# Patient Record
Sex: FEMALE | Race: WHITE | Employment: STUDENT | URBAN - METROPOLITAN AREA
[De-identification: names, ages, dates, MRNs, and addresses within clinical notes are randomized per-mention and may not be internally consistent; named-entity substitution may affect disease eponyms.]

---

## 2019-07-17 ENCOUNTER — OFFICE VISIT (OUTPATIENT)
Dept: INTERNAL MEDICINE CLINIC | Age: 19
End: 2019-07-17
Payer: COMMERCIAL

## 2019-07-17 VITALS
DIASTOLIC BLOOD PRESSURE: 60 MMHG | BODY MASS INDEX: 23.86 KG/M2 | WEIGHT: 152 LBS | HEIGHT: 67 IN | SYSTOLIC BLOOD PRESSURE: 110 MMHG

## 2019-07-17 DIAGNOSIS — Z13.1 SCREENING FOR DIABETES MELLITUS: ICD-10-CM

## 2019-07-17 DIAGNOSIS — J30.2 SEASONAL ALLERGIES: ICD-10-CM

## 2019-07-17 DIAGNOSIS — Z13.220 LIPID SCREENING: ICD-10-CM

## 2019-07-17 DIAGNOSIS — E01.0 THYROMEGALY: ICD-10-CM

## 2019-07-17 DIAGNOSIS — Z83.49 FAMILY HISTORY OF HASHIMOTO THYROIDITIS: ICD-10-CM

## 2019-07-17 DIAGNOSIS — Z00.00 ANNUAL PHYSICAL EXAM: Primary | ICD-10-CM

## 2019-07-17 DIAGNOSIS — Z83.49 FAMILY HISTORY OF THYROID DISEASE: ICD-10-CM

## 2019-07-17 PROCEDURE — 99385 PREV VISIT NEW AGE 18-39: CPT | Performed by: INTERNAL MEDICINE

## 2019-07-17 SDOH — HEALTH STABILITY: MENTAL HEALTH: HOW OFTEN DO YOU HAVE A DRINK CONTAINING ALCOHOL?: 2-3 TIMES A WEEK

## 2019-07-17 SDOH — HEALTH STABILITY: MENTAL HEALTH: HOW MANY STANDARD DRINKS CONTAINING ALCOHOL DO YOU HAVE ON A TYPICAL DAY?: 3 OR 4

## 2019-07-17 ASSESSMENT — PATIENT HEALTH QUESTIONNAIRE - PHQ9
1. LITTLE INTEREST OR PLEASURE IN DOING THINGS: 0
SUM OF ALL RESPONSES TO PHQ QUESTIONS 1-9: 0
2. FEELING DOWN, DEPRESSED OR HOPELESS: 0
SUM OF ALL RESPONSES TO PHQ9 QUESTIONS 1 & 2: 0
SUM OF ALL RESPONSES TO PHQ QUESTIONS 1-9: 0

## 2019-07-17 NOTE — PROGRESS NOTES
[facility]  Internal Medicine Progress Note  [place] MD  7/17/2019    Luz Elena Grant  YOB: 2000    Medical Assistant Triage:     Chief Complaint   Patient presents with    Annual Exam       HPI: 22 y.o. female here today to establish care and assess status of chronic medical problems. 4th of 5 years at [place], [place] and [employer]. Research in [place] () this summer. Will be off campus this year. 3 roommates. Semester abroad in [place] 1st semester last year. Gained weight but has been able to lose gradually since back. Allergies spring and fall. Zyrtec. Laser retinal tear last month. Very Tinnie Starling Dr. Link Vu March of this year. Past Medical History:   Diagnosis Date    Seasonal allergies        Past Surgical History:   Procedure Laterality Date    REPAIR RETINAL TEAR/HOLE  06/2019    TONSILLECTOMY AND ADENOIDECTOMY  06/2017    WISDOM TOOTH EXTRACTION  07/2017       Family Hx:      Problem Relation Age of Onset    Hashimoto Thyroiditis Mother     Diabetes Mother     Hypertension Father     No Known Problems Brother     Breast Cancer Paternal Aunt         mid 42's       Social History     Tobacco Use    Smoking status: Never Smoker    Smokeless tobacco: Never Used   Substance Use Topics    Alcohol use: Yes     Frequency: 2-3 times a week     Drinks per session: 3 or 4     Binge frequency: Less than monthly       Review of Systems  As documented in HPI and patient questionnaire (scanned)    Current Outpatient Medications   Medication Sig Dispense Refill    Levonorgestrel (KYLEENA IU) by Intrauterine route       No current facility-administered medications for this visit. Physical Exam  Vitals:    07/17/19 0803   BP: 110/60   Weight: 152 lb (68.9 kg)   Height: 5' 6.5\" (1.689 m)     Body mass index is 24.17 kg/m². Physical Exam   Constitutional: She appears well-developed and well-nourished.    HENT:   Head: Normocephalic

## 2019-08-16 ENCOUNTER — PATIENT MESSAGE (OUTPATIENT)
Dept: INTERNAL MEDICINE CLINIC | Age: 19
End: 2019-08-16

## 2019-10-22 ENCOUNTER — PATIENT MESSAGE (OUTPATIENT)
Dept: INTERNAL MEDICINE CLINIC | Age: 19
End: 2019-10-22

## 2019-12-23 ENCOUNTER — HOSPITAL ENCOUNTER (OUTPATIENT)
Dept: ULTRASOUND IMAGING | Age: 19
Discharge: HOME | End: 2019-12-23
Payer: COMMERCIAL

## 2019-12-23 DIAGNOSIS — E01.0 THYROMEGALY: ICD-10-CM

## 2019-12-23 PROCEDURE — 76536 US EXAM OF HEAD AND NECK: CPT

## 2019-12-26 ENCOUNTER — TELEPHONE (OUTPATIENT)
Dept: INTERNAL MEDICINE CLINIC | Age: 19
End: 2019-12-26

## 2020-07-14 ENCOUNTER — HOSPITAL ENCOUNTER (OUTPATIENT)
Age: 20
Discharge: HOME | End: 2020-07-14
Payer: COMMERCIAL

## 2020-07-14 ENCOUNTER — HOSPITAL ENCOUNTER (OUTPATIENT)
Dept: GENERAL RADIOLOGY | Age: 20
Discharge: HOME | End: 2020-07-14
Payer: COMMERCIAL

## 2020-07-14 PROCEDURE — 73630 X-RAY EXAM OF FOOT: CPT

## 2020-09-22 ENCOUNTER — TELEPHONE (OUTPATIENT)
Dept: INTERNAL MEDICINE CLINIC | Age: 20
End: 2020-09-22

## 2020-12-07 ENCOUNTER — VIRTUAL VISIT (OUTPATIENT)
Dept: PSYCHOLOGY | Age: 20
End: 2020-12-07
Payer: COMMERCIAL

## 2020-12-07 PROCEDURE — 90791 PSYCH DIAGNOSTIC EVALUATION: CPT | Performed by: PSYCHOLOGIST

## 2020-12-07 ASSESSMENT — ANXIETY QUESTIONNAIRES
2. NOT BEING ABLE TO STOP OR CONTROL WORRYING: 2-OVER HALF THE DAYS
1. FEELING NERVOUS, ANXIOUS, OR ON EDGE: 3-NEARLY EVERY DAY
4. TROUBLE RELAXING: 2-OVER HALF THE DAYS
6. BECOMING EASILY ANNOYED OR IRRITABLE: 2-OVER HALF THE DAYS
3. WORRYING TOO MUCH ABOUT DIFFERENT THINGS: 2-OVER HALF THE DAYS
5. BEING SO RESTLESS THAT IT IS HARD TO SIT STILL: 1-SEVERAL DAYS
GAD7 TOTAL SCORE: 13
7. FEELING AFRAID AS IF SOMETHING AWFUL MIGHT HAPPEN: 1-SEVERAL DAYS

## 2020-12-07 ASSESSMENT — PATIENT HEALTH QUESTIONNAIRE - PHQ9
6. FEELING BAD ABOUT YOURSELF - OR THAT YOU ARE A FAILURE OR HAVE LET YOURSELF OR YOUR FAMILY DOWN: 1
3. TROUBLE FALLING OR STAYING ASLEEP: 2
7. TROUBLE CONCENTRATING ON THINGS, SUCH AS READING THE NEWSPAPER OR WATCHING TELEVISION: 2
8. MOVING OR SPEAKING SO SLOWLY THAT OTHER PEOPLE COULD HAVE NOTICED. OR THE OPPOSITE, BEING SO FIGETY OR RESTLESS THAT YOU HAVE BEEN MOVING AROUND A LOT MORE THAN USUAL: 1
9. THOUGHTS THAT YOU WOULD BE BETTER OFF DEAD, OR OF HURTING YOURSELF: 0
4. FEELING TIRED OR HAVING LITTLE ENERGY: 2
SUM OF ALL RESPONSES TO PHQ QUESTIONS 1-9: 13
SUM OF ALL RESPONSES TO PHQ QUESTIONS 1-9: 13
1. LITTLE INTEREST OR PLEASURE IN DOING THINGS: 2
SUM OF ALL RESPONSES TO PHQ9 QUESTIONS 1 & 2: 3
5. POOR APPETITE OR OVEREATING: 2
SUM OF ALL RESPONSES TO PHQ QUESTIONS 1-9: 13
2. FEELING DOWN, DEPRESSED OR HOPELESS: 1

## 2020-12-07 NOTE — PATIENT INSTRUCTIONS
Deep breathing    Why does this work? When we are stressed or anxious, our stress response in the body is turned on. We can use deep breathing to turn this off. There is a nerve in the body called the, vagus nerve, that helps relax your body. When we take deep, belly breaths, we place pressure on this nerve to help the body relax. Also, you are allowing oxygen to get to your body and brain and breathing out the carbon dioxide which is a toxin to the body. Deep breathing instructions:    1 Sit comfortably, with your knees bent and your shoulders, head and neck relaxed. You can close your eyes if comfortable or find a comfortable place to focus your eyes (e.g. spot on the wall/floor where eyes are not strained)  2 Place one hand on your upper chest and the other just below your rib cage. This will allow you to feel your diaphragm move as you breathe. 3 Breathe in slowly through your nose so that your stomach moves out against your hand. The hand on your chest should remain as still as possible. 4 Tighten your stomach muscles, letting them fall inward as you exhale through pursed lips. The hand on your upper chest must remain as still as possible. Note: You may notice an increased effort will be needed to use the diaphragm correctly. At first, you'll probably get tired while doing this exercise or find your thoughts wondering. But keep at it, because with continued practice, diaphragmatic breathing will become easy and automatic. How often should I practice this exercise? At first, practice this exercise 3-5 minutes about 3-4 times per day. Gradually increase the amount of time you spend doing this exercise, and perhaps even increase the effort of the exercise by placing a book on your abdomen. It is important to practice this skill regularly, even when you don't \"need\" it. Jose Elias: [id number]    Parcel video:  [email or web address]    TIPS  1. Try to focus on one thought or phrase that is neutral or positive. If your mind wonders, that is okay, bring your attention back to that thought/phrase. 2. Practice when you do not need this skill. It will not work if the first time practicing is during an emotional emergency. Your body and brain need to learn how this skill works  3. You don't have to be perfect at it, just do your best      Mindfulness with the senses    You can follow this exercise that has you spend one minute on each sense or you can pick to describe in detail  3 things you hear, smell, see, taste, touch. Sit in a comfortable upright position with your feet planted flat on the ground. Rest your hands on your thighs or on your desk. Notice your breath. No need to breathe in any particular way. Just bring attention to each part of the breath- the inhale, exhale, and space in between. Bring awareness to each of your 5 senses. The point here is to focus on the present moment and how each sense is being activated in that moment. Hear: For one minute, begin to notice all of the sounds around you. Try not to  the sounds- just notice them. They are not good or bad, they just are. Sounds might be internal, like breathing or digestion. Sounds might be close by or more distant like the sound of traffic. Are you now hearing more than you were before you started? You may begin to notice subtle sounds you did not hear before. Can you hear them now? Smell: For one minute, shift your attention to notice the smells of your environment. Maybe you smell food. You might become aware of the smell of trees or plants if you are outside. You might notice the smell of books or paper. Sometimes closing your eyes can help sharpen your attention. See: For one minute, observe your surrounding and notice the colors, shapes and textures. If you really look, you may notice things that have gone unnoticed. Taste: For one minute, you can do this one even if you have food in your mouth.  You may notice an aftertaste of a previous drink or meal. You can just notice your tongue in your mouth, your saliva, and your breath as you exhale. We have tastes in our mouth that often go unnoticed. You can run your tongue over your teeth and cheeks to help you become more aware. Touch: For one minute, bring your attention to the sensations of skin contact with your chair, clothing, and feet on the floor. You can notice the pressure between your feet and the floor or your body and the chair. You can observe temperature like the warmth or coolness of your hands or feet. You might take time to feel the textures that you noticed by sight a moment ago. You can feel several objects on your desk to fully focus your attention on the present. When finished, pause to notice how your body feels in this moment. Compare how you feel now with how you felt 5 minutes ago. What has changed? Try this exercise next time you're feeling overwhelmed. This can be useful to use before a test or speech, too!

## 2020-12-07 NOTE — PROGRESS NOTES
Behavioral Health Consultation  Audra Garza Psy.D. Psychologist  12/7/2020  Start time 8:00am Stop time 8:30am    Time spent with Patient: 30 minutes  This is patient's [facility] appointment. Reason for Consult:  anxiety  Referring Provider: PCP    Feedback for PCP: No action needed. Writer will continue to follow pt. At initial visit, pt/guardian provided informed consent for the behavioral health program. Discussed with patient model of service to include the limits of confidentiality (i.e. abuse reporting, suicide intervention, etc.) and short-term intervention focused approach. Pt/guardian indicated understanding    TELEHEALTH VISIT -- Audio and video (During [facility]- public health emergency)  }  Pursuant to the emergency declaration under the [facility], 1135 waiver authority and the [employer] and [employer] Act, this visit was conducted, with patient/guardian's consent, to reduce the patient's risk of exposure to COVID-19 and provide continuity of care for an established patient. Services were provided through a telehealth discussion to substitute for in-person clinic visit. Pt/guardian gave verbal informed consent to participate in telehealth services. Consent:  She and/or health care decision maker is aware that that she may receive a bill for this service, depending on her insurance coverage, and has provided verbal consent to proceed: Yes    Conducted a risk-benefit analysis and determined that the patient's presenting problems are consistent with the use of telepsychology. Determined that the patient has sufficient knowledge and skills in the use of technology enabling them to adequately benefit from telepsychology. It was determined that this patient was able to be properly treated without an in-person session.  Patient/guardian verified that they were currently located at the [facility] address that was provided during registration. Verified the following information:  Patient's identification: Yes  Patient location: [facility] [address]  Patient's call back number: [phone]   Patient's emergency contact's name and number, as well as permission to contact them if needed: Extended Emergency Contact Information  Primary Emergency Contact: [address] Phone: [phone]  Mobile Phone: [phone]  Relation: Parent  Preferred language: English   needed? No     Provider location: [address] [place] [address], [address] [place] [address] affirm this is an episode with a patient who has not had a related appointment within my department in the past 7 days or scheduled within the next 24 hours. S:    Reports that she has been feeling more down and anxious over the past few months. Is going to be moving to [place] for work in June or July and is nervous about this. Does not want to move away from family and friends, but is excited about the job. Worries about her performance at this job. States that she and her boyfriend are still deciding what to do about him moving out there with her.      Depression sx: anhedonia/diminished interest in activities, depressed mood, insomnia, fatigue and difficulties with concentration, feels down this time of year, but manages it pretty well, but feels that she is not managing as well this year   Anxiety sx: excessive worry, uncontrollable worry, fatigue, difficulty concentrating and sleep disturbance, worries about moving to [place], not going to do well at new job, school   SI/HI: Denied   Coping skills: exercise, light therapy    History:    Psychiatric history:   Current psychotropic medications:  Denied   Past mental health treatment: therapy in high school for anxiety and depression    Social History:    Social: family, friends all in [address] [place] [address], boyfriend    Family psychiatric history: Denied   Employment: moving to [address] [place] [address] to be an  in June or July,

## 2020-12-18 NOTE — PROGRESS NOTES
Behavioral Health Consultation  Carolina Morales Psy.D. Psychologist  12/21/2020    Start time 8:30am  Stop time 8:47am    Time spent with Patient: 17 minutes  [facility]. Reason for Consult:  anxiety  Referring Provider: PCP    Feedback for PCP: No action needed. Writer will continue to follow pt. At initial visit, pt/guardian provided informed consent for the behavioral health program. Discussed with patient model of service to include the limits of confidentiality (i.e. abuse reporting, suicide intervention, etc.) and short-term intervention focused approach. Pt/guardian indicated understanding    TELEHEALTH VISIT -- Audio and video (During [facility] public health emergency)  }  Pursuant to the emergency declaration under the [facility], 1135 waiver authority and the [employer] and [employer] Act, this visit was conducted, with patient/guardian's consent, to reduce the patient's risk of exposure to COVID-19 and provide continuity of care for an established patient. Services were provided through a telehealth discussion to substitute for in-person clinic visit. Pt/guardian gave verbal informed consent to participate in telehealth services. Consent:  She and/or health care decision maker is aware that that she may receive a bill for this service, depending on her insurance coverage, and has provided verbal consent to proceed: Yes    Conducted a risk-benefit analysis and determined that the patient's presenting problems are consistent with the use of telepsychology. Determined that the patient has sufficient knowledge and skills in the use of technology enabling them to adequately benefit from telepsychology. It was determined that this patient was able to be properly treated without an in-person session.  Patient/guardian verified that they were currently located at the [facility] address that was provided during registration. Verified the following information:  Patient's identification: Yes  Patient location: [place] [address]  Patient's call back number: [phone]   Patient's emergency contact's name and number, as well as permission to contact them if needed: Extended Emergency Contact Information  Primary Emergency Contact: Fco Felix Phone: [phone]  Mobile Phone: [phone]  Relation: Parent  Preferred language: English   needed? No     Provider location: [place], [address] affirm this is an episode with a patient who has not had a related appointment within my department in the past 7 days or scheduled within the next 24 hours. S:    Pt set the following goals at last visit: 1) deep breathing 2) mindfulness with senses    Patient reports that her mood has improved significantly since initial visit. Has been practicing living in the present moment and not worrying too much about moving to [place] in a few months. Patient states that she has decided that her and her boyfriend are going to discuss her moving in the next three months. Patient feels good about this. Reports that she is off work for over a week and is not is school for the next month. Is excited to relax and have a break. Would like to follow-up in 6 weeks.      Depression sx: anhedonia/diminished interest in activities, depressed mood, insomnia, fatigue and difficulties with concentration, feels down this time of year, but manages it pretty well, but feels that she is not managing as well this year   Anxiety sx: excessive worry, uncontrollable worry, fatigue, difficulty concentrating and sleep disturbance, worries about moving to [place], not going to do well at new job, school   SI/HI: Denied   Coping skills: exercise, light therapy    History:    Psychiatric history:   Current psychotropic medications:  Denied   Past mental health treatment: therapy in high school for anxiety and depression    Social History:    Social: family, friends all in [place], boyfriend    Family psychiatric history: Denied   Employment: moving to [address] [place] [address] Rd to be an  in June or July, [employer]   Race/ethnicity: \"white\"   Jewish/spiritual beliefs: [facility] habits:   Sleep: varies, worries, 6-8 hours of sleep per night   Caffeine: reduced for anxiety, has helped a little   Exercise: yoga 3-4 times a week, walking or running   Medication adherence: Yes  Social History     Tobacco Use    Smoking status: Never Smoker    Smokeless tobacco: Never Used   Substance Use Topics    Alcohol use: Yes     Frequency: 2-3 times a week     Drinks per session: 3 or 4     Binge frequency: Less than monthly       Social History     Substance and Sexual Activity   Drug Use Never       Current Outpatient Medications   Medication Sig Dispense Refill    Levonorgestrel (KYLEENA IU) by Intrauterine route       No current facility-administered medications for this visit.       O:  MSE:    Appearance: good hygiene   Attitude: cooperative and friendly  Consciousness: alert  Orientation: oriented to person, place, time, general circumstance  Memory: recent and remote memory intact  Attention/Concentration: intact during session  Psychomotor Activity:normal  Eye Contact: normal  Speech: normal rate and volume, well-articulated  Mood: good  Affect: euthymic  Perception: within normal limits  Thought Content: within normal limits  Thought Process: logical, coherent and goal-directed  Insight: good  Judgment: intact  Ability to understand instructions: Yes  Ability to respond meaningfully: Yes  Morbid Ideation: no   Suicide Assessment: no suicidal ideation, plan, or intent  Homicidal Ideation: no    A:  Administered PHQ-9 (see below).   PHQ Scores 12/7/2020 7/17/2019   PHQ2 Score 3 0   PHQ9 Score 13 0     Interpretation of Total Score Depression Severity: 1-4 = Minimal depression, 5-9 = Mild depression, 10-14 = Moderate depression, 15-19 = Moderately severe depression, 20-27 = Severe depression    Administered LEÓN-7 (see below). LEÓN 7 SCORE 12/7/2020   LEÓN-7 Total Score 13     Interpretation of LEÓN-7 score: 5-9 = mild anxiety, 10-14 = moderate anxiety, 15+ = severe anxiety. Recommend referral to behavioral health for scores 10 or greater. Assessment/Progress in treatment/Treatment plan:  Patient appears to be experiencing low mood and anxiety, exacerbated by planning to move to [place] for work. Current coping skills include exercise, social supports, and light therapy and factors maintaining symptoms include catastrophic thinking. Is engaged in behavioral health treatment and reports improvements in mood. May benefit from additional brief intervention from writer for adaptive coping skill development. Will refer to specialty mental health in the future if indicated. Diagnosis:    1. Adjustment disorder with mixed anxiety and depressed mood       Patient Active Problem List   Diagnosis    Seasonal allergies    Family history of Hashimoto thyroiditis        Plan:  Set the following goals: 1) continue current coping skills    Follow-up:   Return in about 6 weeks (around 2/1/2021).      Pt interventions:  Established rapport, [place]-setting to identify pt's primary goals for [facility] visit / overall health, Supportive techniques, Engaged in treatment planning, Emphasized self-care as important for managing overall health and Praised pt for use of skills

## 2020-12-21 ENCOUNTER — TELEMEDICINE (OUTPATIENT)
Dept: PSYCHOLOGY | Age: 20
End: 2020-12-21
Payer: COMMERCIAL

## 2020-12-21 PROCEDURE — 90832 PSYTX W PT 30 MINUTES: CPT | Performed by: PSYCHOLOGIST

## 2020-12-30 ENCOUNTER — OFFICE VISIT (OUTPATIENT)
Dept: INTERNAL MEDICINE CLINIC | Age: 20
End: 2020-12-30
Payer: COMMERCIAL

## 2020-12-30 VITALS
TEMPERATURE: 97.9 F | DIASTOLIC BLOOD PRESSURE: 72 MMHG | OXYGEN SATURATION: 99 % | HEART RATE: 78 BPM | RESPIRATION RATE: 14 BRPM | WEIGHT: 135 LBS | HEIGHT: 66 IN | SYSTOLIC BLOOD PRESSURE: 116 MMHG | BODY MASS INDEX: 21.69 KG/M2

## 2020-12-30 PROCEDURE — 99395 PREV VISIT EST AGE 18-39: CPT | Performed by: INTERNAL MEDICINE

## 2020-12-30 NOTE — PROGRESS NOTES
Annual Physical Exam      Fausto Velasquez  : 2000  Date of Service: 2020    Chief Complaint:20 y.o. female here for    Chief Complaint   Patient presents with    Annual Exam       HPI:  Will be moving to [place] for job with [employer] after graduates this spring. Lost weight since COVID. Eating better    Only problem was that she had some left sided chest pain , worse when moved. Much better. No injury or precipitant  that she recalls    Exercise: yoga, cardio walking   Diet habits: halthy    No LMP recorded. (Menstrual status: Other - See Notes). .        Patient Care Team:  Jodi Velasco MD as PCP - General (Internal Medicine)  Jodi Velasco MD as PCP - [facility] Empaneled Provider  Kelli Berumen.  Donna Temple MD (Ophthalmology)  Edna Frederick (Obstetrics & Gynecology)  Ming Barlow MD as Consulting Physician (Ophthalmology)  Valentine Haney PSYD (Psychology)    Health Maintenance   Topic Date Due    Hepatitis C screen  1998    Chlamydia screen  2014    Cervical cancer screen  07/10/2023    DTaP/Tdap/Td vaccine (8 - Td) 06/15/2028    Hepatitis A vaccine  Completed    Hepatitis B vaccine  Completed    Hib vaccine  Completed    HPV vaccine  Completed    Varicella vaccine  Completed    Meningococcal (ACWY) vaccine  Completed    Flu vaccine  Completed    HIV screen  Completed    Pneumococcal 0-64 years Vaccine  Aged [employer] History   Administered Date(s) Administered    DTP 1998, 1998, 1998, 1999, 2002    HPV Quadrivalent (Gardasil) 2011, 2011, 2011    Hepatitis A Ped/Adol (Havrix, Vaqta) 2013, 05/15/2014    Hepatitis B 1998, 1998, 1998    Hib vaccine 1998, 1998, 1998, 1999    Influenza Virus Vaccine 10/18/2010, 10/26/2011, 10/10/2013, 10/22/2014, 2018, 2020    Influenza, MDCK Quadv, IM, PF (Flucelvax 4 yrs and older) 2018    MMR 06/14/1999, 03/13/2003    Meningococcal B, Recombinant Fredrich Inks) 02/15/2019, 10/20/2019    Meningococcal, MCV4, Unspecifd Conjugate (A,C,Y and W-135) 03/19/2009, 05/06/2013, 08/18/2016    Polio IPV (IPOL) 04/23/2002    Polio OPV 1998, 1998, 09/14/1999    Tdap (Boostrix, Adacel) 03/19/2009, 06/15/2018    Varicella (Varivax) 04/23/2011, 06/23/2011       No Known Allergies    Outpatient Medications Marked as Taking for the 12/30/20 encounter (Office Visit) with Susan Galindo MD   Medication Sig Dispense Refill    Levonorgestrel (KYLEENA IU) by Intrauterine route         Past Medical History:   Diagnosis Date    Family history of Hashimoto thyroiditis     Seasonal allergies      Past Surgical History:   Procedure Laterality Date    REPAIR RETINAL TEAR/HOLE  06/2019    TONSILLECTOMY AND ADENOIDECTOMY  06/2017    WISDOM TOOTH EXTRACTION  07/2017     Family History   Problem Relation Age of Onset    Hashimoto Thyroiditis Mother     Diabetes Mother     Hypertension Father     No Known Problems Brother     Breast Cancer Paternal Aunt         mid 42's     Social History     Tobacco Use    Smoking status: Never Smoker    Smokeless tobacco: Never Used   Substance Use Topics    Alcohol use: Yes     Frequency: 2-3 times a week     Drinks per session: 3 or 4     Binge frequency: Less than monthly    Drug use: Never         Review of Systems    Wt Readings from Last 3 Encounters:   12/30/20 135 lb (61.2 kg)   07/17/19 152 lb (68.9 kg)     BP Readings from Last 3 Encounters:   12/30/20 116/72   07/17/19 110/60       Physical Exam:   Vitals:    12/30/20 0804   BP: 116/72   Pulse: 78   Resp: 14   Temp: 97.9 °F (36.6 °C)   SpO2: 99%   Weight: 135 lb (61.2 kg)   Height: 5' 6\" (1.676 m)     Body mass index is 21.79 kg/m². Physical Exam  Constitutional:       Appearance: Normal appearance. She is well-developed. HENT:      Head: Normocephalic and atraumatic.       Right Ear: Tympanic membrane, ear canal and external ear normal.      Left Ear: Tympanic membrane, ear canal and external ear normal.      Nose: Nose normal.      Mouth/Throat:      Mouth: Mucous membranes are moist.      Pharynx: Oropharynx is clear. Eyes:      Conjunctiva/sclera: Conjunctivae normal.      Pupils: Pupils are equal, round, and reactive to light. Neck:      Musculoskeletal: Normal range of motion and neck supple. Thyroid: No thyromegaly. Vascular: No carotid bruit. Cardiovascular:      Rate and Rhythm: Normal rate and regular rhythm. Pulses: Normal pulses. Heart sounds: Normal heart sounds. No murmur. Pulmonary:      Effort: Pulmonary effort is normal.      Breath sounds: Normal breath sounds. Chest:      Chest wall: Tenderness (very slight chest wall pain with palpation left lower anterior rib cage) present. Abdominal:      General: Bowel sounds are normal.      Palpations: Abdomen is soft. There is no mass. Tenderness: There is no abdominal tenderness. Genitourinary:     Exam position: Supine. Musculoskeletal:      Right lower leg: No edema. Left lower leg: No edema. Lymphadenopathy:      Cervical: No cervical adenopathy. Skin:     General: Skin is warm and dry. Coloration: Skin is not pale. Comments: No suspicious skin lesions   Neurological:      General: No focal deficit present. Mental Status: She is alert. Psychiatric:         Mood and Affect: Mood normal.         Assessment/Plan:  Annual PE/Wellness exam  Discussed age appropriate preventive care including healthy diet, daily exercise, immunizations and age & gender guided screening tests. Moving to [facility] summer 2021. Will establish with new doctor once there. Vanna Walls MD    This note was generated completely or in part utilizing Dragon dictation speech recognition software. Occasionally, words are mistranscribed and despite editing, the text may contain inaccuracies due to incorrect word recognition.   If further clarification is needed please contact the office at [phone]

## 2021-05-13 LAB — PAP SMEAR, EXTERNAL: NORMAL

## 2021-11-29 ENCOUNTER — TELEPHONE (OUTPATIENT)
Dept: INTERNAL MEDICINE CLINIC | Age: 21
End: 2021-11-29

## 2021-11-29 DIAGNOSIS — M25.579 ACUTE ANKLE PAIN, UNSPECIFIED LATERALITY: Primary | ICD-10-CM

## 2021-11-29 NOTE — TELEPHONE ENCOUNTER
----- Message from Keiry Baires sent at 11/29/2021 10:07 AM EST -----  Subject: Appointment Request    Reason for Call: Routine Medicare AWV    QUESTIONS  Type of Appointment? Established Patient  Reason for appointment request? No appointments available during search  Additional Information for Provider? PT requesting annual physical, last   physical was 12/30/2020; pt has new insurance that may cover appt before   that date.   ---------------------------------------------------------------------------  --------------  CALL BACK INFO  What is the best way for the office to contact you? OK to leave message on   voicemail, OK to respond with electronic message via [employer] portal (only   for patients who have registered [employer] account)  Preferred Call Back Phone Number? [phone]  ---------------------------------------------------------------------------  --------------  SCRIPT ANSWERS  Relationship to Patient? Self  (If the patient has Medicare as their primary insurance coverage ask this   question) Are you requesting a Medicare Annual Wellness Visit? Yes   (Is the patient requesting a pap smear with their physical exam?)? No  (Is the patient requesting their annual physical and does not need PAP or   AWV per above?)? Yes   Have you been diagnosed with, awaiting test results for, or told that you   are suspected of having COVID-19 (Coronavirus)? (If patient has tested   negative or was tested as a requirement for work, school, or travel and   not based on symptoms, answer no)? No  Within the past two weeks have you developed any of the following symptoms   (answer no if symptoms have been present longer than 2 weeks or began   more than 2 weeks ago)?  Fever or Chills, Cough, Shortness of breath or   difficulty breathing, Loss of taste or smell, Sore throat, Nasal   congestion, Sneezing or runny nose, Fatigue or generalized body aches   (answer no if pain is specific to a body part e.g. back pain), Diarrhea, Headache? No  Have you had close contact with someone with COVID-19 in the last 14 days? No  (Service Expert  click yes below to proceed with [email or web address] As Usual   Scheduling)?  Yes

## 2021-11-30 NOTE — TELEPHONE ENCOUNTER
Patient actually lives in [place] and is leaving to go back home tomorrow. So she would like to keep her appointment with you today.   Routed back to Dr Katia Escalante for [facility]

## 2021-11-30 NOTE — TELEPHONE ENCOUNTER
Please call patient. She is on my schedule today for ongoing ankle pain after an injury approx 1 month ago. Fairly certain she will need to see an orthopedist, so want to save her this appointment, and direct her to ortho. I've put in a referral to Dr. Gloria Javier at [facility]. He specializes in foot and ankle. If she still wants to be seen, it's ok.     [facility], [address], J[address] [address]  Ph: [phone]

## 2021-12-29 ENCOUNTER — TELEPHONE (OUTPATIENT)
Dept: INTERNAL MEDICINE CLINIC | Age: 21
End: 2021-12-29

## 2021-12-29 ENCOUNTER — OFFICE VISIT (OUTPATIENT)
Dept: INTERNAL MEDICINE CLINIC | Age: 21
End: 2021-12-29
Payer: COMMERCIAL

## 2021-12-29 VITALS
DIASTOLIC BLOOD PRESSURE: 66 MMHG | SYSTOLIC BLOOD PRESSURE: 118 MMHG | OXYGEN SATURATION: 99 % | HEIGHT: 67 IN | WEIGHT: 144.6 LBS | RESPIRATION RATE: 16 BRPM | BODY MASS INDEX: 22.7 KG/M2 | HEART RATE: 68 BPM

## 2021-12-29 DIAGNOSIS — K62.5 RECTAL BLEEDING: ICD-10-CM

## 2021-12-29 DIAGNOSIS — Z13.220 LIPID SCREENING: ICD-10-CM

## 2021-12-29 DIAGNOSIS — Z00.00 ANNUAL PHYSICAL EXAM: Primary | ICD-10-CM

## 2021-12-29 DIAGNOSIS — Z13.1 SCREENING FOR DIABETES MELLITUS: ICD-10-CM

## 2021-12-29 PROCEDURE — 99395 PREV VISIT EST AGE 18-39: CPT | Performed by: INTERNAL MEDICINE

## 2021-12-29 SDOH — ECONOMIC STABILITY: FOOD INSECURITY: WITHIN THE PAST 12 MONTHS, THE FOOD YOU BOUGHT JUST DIDN'T LAST AND YOU DIDN'T HAVE MONEY TO GET MORE.: NEVER TRUE

## 2021-12-29 SDOH — ECONOMIC STABILITY: FOOD INSECURITY: WITHIN THE PAST 12 MONTHS, YOU WORRIED THAT YOUR FOOD WOULD RUN OUT BEFORE YOU GOT MONEY TO BUY MORE.: NEVER TRUE

## 2021-12-29 ASSESSMENT — ENCOUNTER SYMPTOMS: RECTAL PAIN: 1

## 2021-12-29 ASSESSMENT — PATIENT HEALTH QUESTIONNAIRE - PHQ9
SUM OF ALL RESPONSES TO PHQ QUESTIONS 1-9: 0
SUM OF ALL RESPONSES TO PHQ9 QUESTIONS 1 & 2: 0
2. FEELING DOWN, DEPRESSED OR HOPELESS: 0
SUM OF ALL RESPONSES TO PHQ QUESTIONS 1-9: 0
SUM OF ALL RESPONSES TO PHQ QUESTIONS 1-9: 0
1. LITTLE INTEREST OR PLEASURE IN DOING THINGS: 0

## 2021-12-29 ASSESSMENT — SOCIAL DETERMINANTS OF HEALTH (SDOH): HOW HARD IS IT FOR YOU TO PAY FOR THE VERY BASICS LIKE FOOD, HOUSING, MEDICAL CARE, AND HEATING?: NOT HARD AT ALL

## 2021-12-29 NOTE — PROGRESS NOTES
ASSESSMENT/PLAN:   Annual physical/preventive evaluation  Discussed age appropriate preventive care including healthy diet, daily exercise, immunizations and age & gender guided screening tests. Screening labs. Rectal bleeding with pain, intermittent  Most consistent with possible anal fissure but unable to visualize. No hemorrhoids seen. Provided instruction on sitz baths, avoiding constipation, provided Rx for lidocaine jelly 2%. Returning to [facility]. Advised she should see a gastroenterologist for further evaluation to evaluate for IBD.  -     CBC; Future      Return in about 1 year (around 2022). See Verma (:  2000) is a 19 y.o. female, here for annual preventive visit. [employer] [place]. In [address] now. Lives in apartment, with 1 room mate. Doing well except having bowel issues for past 4-5 months. Intermittent pain with defecation and has had some blood on toilet paper. Will have for everal days and then nothing for a week or s. No pattern. Soft stools, twice daily in last fw years. No diarrhea, no abdominal pain. No straining. No pattern. Ebenezer Crafts of family history of IBD. Exercise: yoga, HIIT workouts  Diet: healthy    No LMP recorded. (Menstrual status: Other - See Notes). Patient Care Team:  Rush Srivastava MD as PCP - General (Internal Medicine)  Rush Srivastava MD as PCP - [address] [place] [address] [facility]  Marco Dove.  Rima Blackman MD (Ophthalmology)  Guerline Yoon (Obstetrics & Gynecology)  Anil Salvador MD as Consulting Physician (Ophthalmology)  Kendra Ramos PSYD (Psychology)    Health Maintenance   Topic Date Due    Hepatitis C screen  2021 (Originally 2000)    Chlamydia screen  2021 (Originally 3/12/2014)    Pap smear  07/10/2023    DTaP/Tdap/Td vaccine (8 - Td or Tdap) 06/15/2028    Hepatitis A vaccine  Completed    Hepatitis B vaccine  Completed    Hib vaccine  Completed    HPV vaccine  Completed    Varicella vaccine  Completed    Meningococcal (ACWY) vaccine  Completed    Flu vaccine  Completed    COVID-19 Vaccine  Completed    HIV screen  Completed    Pneumococcal 0-64 years Vaccine  Aged [employer] History   Administered Date(s) Administered    COVID-19, Pfizer, PF, 30mcg/0.3mL 03/26/2021, 04/23/2021, 11/22/2021    DTP 1998, 1998, 1998, 09/14/1999, 04/23/2002    HPV Quadrivalent (Gardasil) 04/08/2011, 06/23/2011, 12/21/2011    Hepatitis A Ped/Adol (Havrix, Vaqta) 05/06/2013, 05/15/2014    Hepatitis B 1998, 1998, 1998    Hib vaccine 1998, 1998, 1998, 06/14/1999    Influenza Virus Vaccine 10/18/2010, 10/26/2011, 10/10/2013, 10/22/2014, 12/07/2018, 09/01/2020, 11/09/2021    Influenza, MDCK Quadv, IM, PF (Flucelvax 2 yrs and older) 12/07/2018    MMR 06/14/1999, 03/13/2003    Meningococcal B, Recombinant Clarene Sharif) 02/15/2019, 10/20/2019    Meningococcal, MCV4, Unspecifd Conjugate (A,C,Y and W-135) 03/19/2009, 05/06/2013, 08/18/2016    Polio IPV (IPOL) 04/23/2002    Polio OPV 1998, 1998, 09/14/1999    Tdap (Boostrix, Adacel) 03/19/2009, 06/15/2018    Varicella (Varivax) 04/23/2011, 06/23/2011       Past Medical History:   Diagnosis Date    Family history of Hashimoto thyroiditis     Seasonal allergies        Outpatient Medications Marked as Taking for the 12/29/21 encounter (Office Visit) with Henry Dillard MD   Medication Sig Dispense Refill    lidocaine (XYLOCAINE) 2 % jelly Apply topically as needed.  28 g 1    Levonorgestrel (KYLEENA IU) by Intrauterine route          No Known Allergies    Past Surgical History:   Procedure Laterality Date    REPAIR RETINAL TEAR/HOLE  06/2019    TONSILLECTOMY AND ADENOIDECTOMY  06/2017    WISDOM TOOTH EXTRACTION  07/2017       Social History     Tobacco Use    Smoking status: Never Smoker    Smokeless tobacco: Never Used   Vaping Use    Vaping Use: Never used   Substance Use Topics  Alcohol use: Yes    Drug use: Never        Family History   Problem Relation Age of Onset    Hashimoto Thyroiditis Mother     Diabetes Mother     Hypertension Father     No Known Problems Brother     Breast Cancer Paternal Aunt         mid 42's     Review of Systems   Constitutional: Positive for fatigue. Gastrointestinal: Positive for rectal pain. Wt Readings from Last 5 Encounters:   12/29/21 144 lb 9.6 oz (65.6 kg)   12/30/20 135 lb (61.2 kg)   07/17/19 152 lb (68.9 kg)     Vitals:    12/29/21 1329   BP: 118/66   Pulse: 68   Resp: 16   SpO2: 99%   Weight: 144 lb 9.6 oz (65.6 kg)   Height: 5' 6.5\" (1.689 m)     Estimated body mass index is 22.99 kg/m² as calculated from the following:    Height as of this encounter: 5' 6.5\" (1.689 m). Weight as of this encounter: 144 lb 9.6 oz (65.6 kg). Physical Exam  Constitutional:       Appearance: Normal appearance. She is well-developed. HENT:      Head: Atraumatic. Right Ear: Tympanic membrane, ear canal and external ear normal.      Left Ear: Tympanic membrane, ear canal and external ear normal.   Eyes:      Conjunctiva/sclera: Conjunctivae normal.      Pupils: Pupils are equal, round, and reactive to light. Neck:      Thyroid: No thyromegaly. Vascular: No carotid bruit. Cardiovascular:      Rate and Rhythm: Normal rate and regular rhythm. Pulses: Normal pulses. Heart sounds: Normal heart sounds. No murmur heard. Pulmonary:      Effort: Pulmonary effort is normal.      Breath sounds: Normal breath sounds. Abdominal:      General: Bowel sounds are normal.      Palpations: Abdomen is soft. There is no mass. Tenderness: There is no abdominal tenderness. Genitourinary:     Exam position: Supine. Rectum: No mass, anal fissure (unable to visualize fissure), external hemorrhoid or internal hemorrhoid. Musculoskeletal:      Cervical back: Normal range of motion and neck supple. Right lower leg: No edema. Left lower leg: No edema. Lymphadenopathy:      Cervical: No cervical adenopathy. Skin:     General: Skin is warm and dry. Coloration: Skin is not pale. Comments: No suspicious skin lesions   Neurological:      General: No focal deficit present. Mental Status: She is alert. Psychiatric:         Mood and Affect: Mood normal.         No flowsheet data found. Lab Results   Component Value Date    CHOL 174 07/17/2019    TRIG 65 07/17/2019    HDL 81 07/17/2019    LDLCALC 80 07/17/2019    GLUCOSE 93 07/17/2019       The ASCVD Risk score (Dominik Kirby., et al., 2013) failed to calculate for the following reasons: The 2013 ASCVD risk score is only valid for ages 36 to 78      An electronic signature was used to authenticate this note.     --Antoine Avila MD

## 2021-12-29 NOTE — TELEPHONE ENCOUNTER
[facility] says they do not carry the 2% lidocaine looks like in th past the patient has gotten the 5% is this ok to give?          lidocaine (XYLOCAINE) 2 % jelly Apply topically as needed. [id number] - [place], [place] - [address].  Lilo Patel [phone] - F [phone]

## 2021-12-29 NOTE — PATIENT INSTRUCTIONS
Patient Education        Anal Fissure: Care Instructions  Your Care Instructions  An anal fissure is a tear in the lining of the lower rectum (anus). It can itch and cause pain. You may notice bright red blood on toilet paper after you wipe. A fissure may form if you are constipated and try to pass a large, hard stool or if you do not relax your anal muscles during a bowel movement. Most anal fissures heal with home treatment after a few days or weeks. If you have an anal fissure that takes more time to heal, your doctor may prescribe medicine. In rare cases, surgery may be needed. Anal fissures do not lead to colon cancer or other serious illnesses. However, if you have blood mixed in with the stool, talk to your doctor. Follow-up care is a key part of your treatment and safety. Be sure to make and go to all appointments, and call your doctor if you are having problems. It's also a good idea to know your test results and keep a list of the medicines you take. How can you care for yourself at home? · If your doctor prescribed cream or ointment, use it exactly as prescribed. Call your doctor if you think you are having a problem with your medicine. You will get more details on the specific medicines your doctor prescribes. · Sit in a few inches of warm water (sitz bath) 3 times a day and after bowel movements. The warm water helps the area heal and eases discomfort. Do not put soaps, salts, or shampoos in the water. · Avoid constipation:  ? Include fruits, vegetables, beans, and whole grains in your diet each day. These foods are high in fiber. ? Drink plenty of fluids. If you have kidney, heart, or liver disease and have to limit fluids, talk with your doctor before you increase the amount of fluids you drink. ? Get some exercise every day. Build up slowly to 30 to 60 minutes a day on 5 or more days of the week. ? Take a fiber supplement, such as Benefiber or Metamucil, every day if needed.  Read and follow all instructions on the label. ? Use the toilet when you feel the urge. Or when you can, schedule time each day for a bowel movement. A daily routine may help. Take your time and do not strain when having a bowel movement. But do not sit on the toilet for more than 10 minutes. · Support your feet with a small step stool when you sit on the toilet. This helps flex your hips and places your pelvis in a squatting position. · Your doctor may recommend an over-the-counter laxative, such as Miralax, Milk of Magnesia, or Ex-Lax. Read and follow all instructions on the label, and do not use these medicines on a long-term basis. · Do not use over-the-counter ointments or creams without talking to your doctor. Some of these preparations may not help. · Use baby wipes or medicated pads, such as Preparation H or Tucks, instead of toilet paper to clean after a bowel movement. These products do not irritate the anus. · Be safe with medicines. Read and follow all instructions on the label. ? If the doctor gave you a prescription medicine for pain, take it as prescribed. ? If you are not taking a prescription pain medicine, ask your doctor if you can take an over-the-counter medicine. When should you call for help? Call your doctor now or seek immediate medical care if:    · You have new or worse pain.     · You have new or worse bleeding from the rectum. Watch closely for changes in your health, and be sure to contact your doctor if:    · You have trouble passing stools.     · You do not get better as expected. Where can you learn more? Go to https://Nebel.TVlizzy.[employer]. org and sign in to your [employer] account. Enter R594 in the [employer] box to learn more about \"Anal Fissure: Care Instructions. \"     If you do not have an account, please click on the \"Sign Up Now\" link. Current as of: September 8, 2021               Content Version: 13.1  © 0747-9829 Healthwise, [facility].    Care instructions adapted under license by [facility] ([facility]). If you have questions about a medical condition or this instruction, always ask your healthcare professional. [address] any warranty or liability for your use of this information.

## 2023-10-08 NOTE — PROGRESS NOTES
ASSESSMENT/PLAN:   Wellness exam  Discussed age appropriate preventive care including healthy diet, daily exercise, immunizations and age & gender guided screening tests. Screening labs, including TSH. Family history of Hashimoto thyroiditis  Assessment & Plan:  Mild constipation and some sleep difficulty recently. Check TSH. Rectal bleeding/pain  Ongoing issue, fiber and water, and advised needs to see GI for further evalution. Return in about 1 year (around 10/9/2024) for CPE. Noah Jacobs (:  2000) is a 20 y.o. female, here for annual preventive visit. Check thyroid, not sleeping well in last few months. Awakens around 1 am, and maybe another time. Takes about 20 minutes to fall back to sleep. IUD removed in July, no menses since then. Using condoms now for contraception. Still having some intermittent pain with defectation and bleeding. BM daily, tries not to strain but tends toward constipation. Exercise: yoga, walks, volleyball, approx 5 days/week  Diet: healthy  Sleep: tries for 8 hours nightly    No LMP recorded. (Menstrual status: Other - See Notes). No menses since IUD removed in July.      Patient Care Team:  Tavo Louie MD as PCP - General (Internal Medicine)  Tavo Louie MD as PCP - Empaneled Provider  Alex Barros MD (Ophthalmology)  Ta Fabian (Obstetrics & Gynecology)  Tran Garduno MD as Consulting Physician (Ophthalmology)  Yoselyn Renner PSYD (Psychology)    Health Maintenance   Topic Date Due    Hepatitis C screen  Never done    COVID-19 Vaccine (4 - Pfizer series) 2022    Depression Screen  2022    Pap smear  2024    DTaP/Tdap/Td vaccine (8 - Td or Tdap) 06/15/2028    Hepatitis A vaccine  Completed    Hepatitis B vaccine  Completed    Hib vaccine  Completed    HPV vaccine  Completed    Varicella vaccine  Completed    Meningococcal (ACWY) vaccine  Completed    Flu vaccine  Completed    HIV screen  Completed

## 2023-10-09 ENCOUNTER — OFFICE VISIT (OUTPATIENT)
Dept: INTERNAL MEDICINE CLINIC | Age: 23
End: 2023-10-09
Payer: COMMERCIAL

## 2023-10-09 VITALS
HEART RATE: 58 BPM | HEIGHT: 66 IN | DIASTOLIC BLOOD PRESSURE: 80 MMHG | SYSTOLIC BLOOD PRESSURE: 124 MMHG | WEIGHT: 152.4 LBS | OXYGEN SATURATION: 99 % | BODY MASS INDEX: 24.49 KG/M2

## 2023-10-09 DIAGNOSIS — Z00.00 WELL ADULT EXAM: ICD-10-CM

## 2023-10-09 DIAGNOSIS — Z11.59 ENCOUNTER FOR HEPATITIS C SCREENING TEST FOR LOW RISK PATIENT: ICD-10-CM

## 2023-10-09 DIAGNOSIS — K62.5 RECTAL BLEEDING: ICD-10-CM

## 2023-10-09 DIAGNOSIS — Z83.49 FAMILY HISTORY OF HASHIMOTO THYROIDITIS: ICD-10-CM

## 2023-10-09 DIAGNOSIS — Z00.00 WELL ADULT EXAM: Primary | ICD-10-CM

## 2023-10-09 DIAGNOSIS — Z13.220 LIPID SCREENING: ICD-10-CM

## 2023-10-09 DIAGNOSIS — Z13.1 SCREENING FOR DIABETES MELLITUS: ICD-10-CM

## 2023-10-09 PROCEDURE — 99395 PREV VISIT EST AGE 18-39: CPT | Performed by: INTERNAL MEDICINE

## 2023-10-09 SDOH — ECONOMIC STABILITY: HOUSING INSECURITY
IN THE LAST 12 MONTHS, WAS THERE A TIME WHEN YOU DID NOT HAVE A STEADY PLACE TO SLEEP OR SLEPT IN A SHELTER (INCLUDING NOW)?: NO

## 2023-10-09 SDOH — ECONOMIC STABILITY: INCOME INSECURITY: HOW HARD IS IT FOR YOU TO PAY FOR THE VERY BASICS LIKE FOOD, HOUSING, MEDICAL CARE, AND HEATING?: NOT HARD AT ALL

## 2023-10-09 SDOH — ECONOMIC STABILITY: FOOD INSECURITY: WITHIN THE PAST 12 MONTHS, THE FOOD YOU BOUGHT JUST DIDN'T LAST AND YOU DIDN'T HAVE MONEY TO GET MORE.: NEVER TRUE

## 2023-10-09 SDOH — ECONOMIC STABILITY: FOOD INSECURITY: WITHIN THE PAST 12 MONTHS, YOU WORRIED THAT YOUR FOOD WOULD RUN OUT BEFORE YOU GOT MONEY TO BUY MORE.: NEVER TRUE

## 2023-10-09 ASSESSMENT — ENCOUNTER SYMPTOMS
EYES NEGATIVE: 1
GASTROINTESTINAL NEGATIVE: 1
RESPIRATORY NEGATIVE: 1

## 2023-10-09 ASSESSMENT — PATIENT HEALTH QUESTIONNAIRE - PHQ9
SUM OF ALL RESPONSES TO PHQ QUESTIONS 1-9: 0
SUM OF ALL RESPONSES TO PHQ QUESTIONS 1-9: 0
SUM OF ALL RESPONSES TO PHQ9 QUESTIONS 1 & 2: 0
SUM OF ALL RESPONSES TO PHQ QUESTIONS 1-9: 0
SUM OF ALL RESPONSES TO PHQ QUESTIONS 1-9: 0
2. FEELING DOWN, DEPRESSED OR HOPELESS: 0
1. LITTLE INTEREST OR PLEASURE IN DOING THINGS: 0

## 2023-10-10 LAB
ANION GAP SERPL CALCULATED.3IONS-SCNC: 14 MMOL/L (ref 3–16)
BUN SERPL-MCNC: 11 MG/DL (ref 7–20)
CALCIUM SERPL-MCNC: 10.3 MG/DL (ref 8.3–10.6)
CHLORIDE SERPL-SCNC: 100 MMOL/L (ref 99–110)
CHOLEST SERPL-MCNC: 190 MG/DL (ref 0–199)
CO2 SERPL-SCNC: 24 MMOL/L (ref 21–32)
CREAT SERPL-MCNC: 0.7 MG/DL (ref 0.6–1.1)
GFR SERPLBLD CREATININE-BSD FMLA CKD-EPI: >60 ML/MIN/{1.73_M2}
GLUCOSE SERPL-MCNC: 89 MG/DL (ref 70–99)
HCV AB SERPL QL IA: NORMAL
HDLC SERPL-MCNC: 101 MG/DL (ref 40–60)
LDLC SERPL CALC-MCNC: 76 MG/DL
POTASSIUM SERPL-SCNC: 4 MMOL/L (ref 3.5–5.1)
SODIUM SERPL-SCNC: 138 MMOL/L (ref 136–145)
TRIGL SERPL-MCNC: 67 MG/DL (ref 0–150)
TSH SERPL DL<=0.005 MIU/L-ACNC: 1.62 UIU/ML (ref 0.27–4.2)
VLDLC SERPL CALC-MCNC: 13 MG/DL